# Patient Record
Sex: FEMALE | Race: WHITE | Employment: FULL TIME | URBAN - NONMETROPOLITAN AREA
[De-identification: names, ages, dates, MRNs, and addresses within clinical notes are randomized per-mention and may not be internally consistent; named-entity substitution may affect disease eponyms.]

---

## 2020-08-27 VITALS
BODY MASS INDEX: 26.05 KG/M2 | DIASTOLIC BLOOD PRESSURE: 70 MMHG | SYSTOLIC BLOOD PRESSURE: 100 MMHG | WEIGHT: 182 LBS | HEIGHT: 70 IN

## 2020-08-27 DIAGNOSIS — R35.0 FREQUENCY OF MICTURITION: ICD-10-CM

## 2020-09-21 ENCOUNTER — TELEPHONE (OUTPATIENT)
Dept: OBGYN CLINIC | Age: 40
End: 2020-09-21

## 2020-09-22 RX ORDER — DROSPIRENONE AND ETHINYL ESTRADIOL 0.03MG-3MG
1 KIT ORAL DAILY
Qty: 1 PACKET | Refills: 3 | Status: SHIPPED | OUTPATIENT
Start: 2020-09-22 | End: 2020-12-29 | Stop reason: SDUPTHER

## 2020-12-29 ENCOUNTER — OFFICE VISIT (OUTPATIENT)
Dept: OBGYN CLINIC | Age: 40
End: 2020-12-29
Payer: COMMERCIAL

## 2020-12-29 ENCOUNTER — HOSPITAL ENCOUNTER (OUTPATIENT)
Age: 40
Setting detail: SPECIMEN
Discharge: HOME | End: 2020-12-29
Payer: COMMERCIAL

## 2020-12-29 VITALS
BODY MASS INDEX: 27.55 KG/M2 | DIASTOLIC BLOOD PRESSURE: 74 MMHG | SYSTOLIC BLOOD PRESSURE: 108 MMHG | HEIGHT: 69 IN | WEIGHT: 186 LBS

## 2020-12-29 PROCEDURE — 99395 PREV VISIT EST AGE 18-39: CPT | Performed by: OBSTETRICS & GYNECOLOGY

## 2020-12-29 RX ORDER — DROSPIRENONE AND ETHINYL ESTRADIOL 0.03MG-3MG
1 KIT ORAL DAILY
Qty: 1 PACKET | Refills: 12 | Status: SHIPPED | OUTPATIENT
Start: 2020-12-29 | End: 2021-12-20 | Stop reason: SDUPTHER

## 2020-12-29 RX ORDER — BUSPIRONE HYDROCHLORIDE 7.5 MG/1
7.5 TABLET ORAL 2 TIMES DAILY
COMMUNITY
Start: 2020-08-24

## 2020-12-29 NOTE — PROGRESS NOTES
DATE OF VISIT:  20  PATIENT NAME:  Carmen Villarreal     YOB: 1980    47 y.o. Chief Complaint   Patient presents with    Gynecologic Exam     Pt. needs pap. Pt. reports an ongoing discharge. She has been checked in the past and told that it is just related to hormones. Pt. denies irritation/odor. LMP approx 2 months ago. Needs refill of fidel. No LMP recorded. Primary Care Physician: Clarisse Perez MD    The patient was seen and examined. She has no chiefcomplaint today and is here for her annual exam.  Her bowels are regular. There are no voiding complaints. She denies any bloating. She denies vaginal discharge and was counseled on STD's and the need for barriercontraception. HPI : Carmen Villarreal is a 47 y.o. female  who presents today for her annual.    ______________________________________________________________________    OB History    Para Term  AB Living   2 2       2   SAB TAB Ectopic Molar Multiple Live Births             2      # Outcome Date GA Lbr Ken/2nd Weight Sex Delivery Anes PTL Lv   2 Para            1 Para              Past Medical History:   Diagnosis Date    Abnormal Pap smear of cervix 2007    HPV. Squamous cell abnormalities: ASCUS.  Negative HPV- 0713,0103, 2017 Pos HPV & ASCUS 2008 w/ colpo    Anxiety     H/O bladder infections     Headache     History of HPV infection     HPV in female     condyloma    Obsessive compulsive disorder     Obsessive compulsive disorder     PCOS (polycystic ovarian syndrome)                                                                    Past Surgical History:   Procedure Laterality Date    BACK SURGERY      Pilpmidal Cysts     Family History   Problem Relation Age of Onset    Depression Father     Other Father         alcohol/drug problem, smoking    Mental Illness Father     Other Mother         smoking    Asthma Sister     Depression Sister  Other Sister         alcohol/drug problem, smoking    Mental Illness Sister      Social History     Socioeconomic History    Marital status:      Spouse name: Not on file    Number of children: Not on file    Years of education: Not on file    Highest education level: Not on file   Occupational History    Not on file   Social Needs    Financial resource strain: Not on file    Food insecurity     Worry: Not on file     Inability: Not on file    Transportation needs     Medical: Not on file     Non-medical: Not on file   Tobacco Use    Smoking status: Former Smoker     Years: 10.00    Smokeless tobacco: Never Used   Substance and Sexual Activity    Alcohol use: Yes     Comment: 2 or less per day.  Drug use: Not Currently     Types: Marijuana     Comment: She used marijuana in her past.    Sexual activity: Yes     Partners: Male   Lifestyle    Physical activity     Days per week: Not on file     Minutes per session: Not on file    Stress: Not on file   Relationships    Social connections     Talks on phone: Not on file     Gets together: Not on file     Attends [employer] service: Not on file     Active member of club or organization: Not on file     Attends meetings of clubs or organizations: Not on file     Relationship status: Not on file    Intimate partner violence     Fear of current or ex partner: Not on file     Emotionally abused: Not on file     Physically abused: Not on file     Forced sexual activity: Not on file   Other Topics Concern    Not on file   Social History Narrative    Not on file     Vitals:    12/29/20 0811   BP: 108/74   Site: Right Upper Arm   Position: Sitting   Weight: 186 lb (84.4 kg)   Height: 5' 9\" (1.753 m)     Body mass index is 27.47 kg/m². No LMP recorded.     MEDICATIONS:  Current Outpatient Medications   Medication Sig Dispense Refill    busPIRone (BUSPAR) 7.5 MG tablet Take 7.5 mg by mouth 2 times daily  drospirenone-ethinyl estradiol (WILMAR 28) 3-0.03 MG TABS Take 1 tablet by mouth daily 1 packet 3    PARoxetine HCl (PAXIL PO) Take by mouth      ALPRAZolam (XANAX PO) Take by mouth       No current facility-administered medications for this visit. ALLERGIES:  Allergies as of 12/29/2020 - Review Complete 12/29/2020   Allergen Reaction Noted    Bactrim [sulfamethoxazole-trimethoprim]  08/27/2020    Ciprofloxacin  08/04/2017           Symptoms of decreased mood absent    **If either question is answered in a  positive fashion then completethe PHQ9 Scoring Evaluation and make the appropriate referral**      Gynecologic History:       No LMP recorded. Sexually Active: Yes    STD History: No     Permanent Sterilization:No   Reversible Birth Control: Yes ocp        Hormone Replacement Exposure: No      Genetic Qualified Family History of Breast, Ovarian , Colon or Uterine Cancer:No   If YES see scanned worksheet. Preventative Health Testing:  Colposcopy History:   Date of Last Mammogram: na  Date of Last Colonoscopy:   Date of Last Bone Density:      ______________________________________________________________________    REVIEW OF SYSTEMS:       Review of Systems   Constitutional: Negative for chills and fever. Genitourinary: Positive for vaginal discharge. Negative for dysuria, menstrual problem and pelvic pain. PHYSICAL EXAM:    Physical Exam  Constitutional:       Appearance: Normal appearance. Genitourinary:      Pelvic exam was performed with patient in the lithotomy position. Vulva, vagina, cervix, uterus, right adnexa and left adnexa normal.   HENT:      Head: Atraumatic. Mouth/Throat:      Mouth: Mucous membranes are moist.   Eyes:      Extraocular Movements: Extraocular movements intact. Pupils: Pupils are equal, round, and reactive to light. Neck:      Musculoskeletal: Normal range of motion. Cardiovascular:      Rate and Rhythm: Normal rate.    Pulmonary: Effort: Pulmonary effort is normal.   Chest:      Breasts:         Right: Normal.         Left: Normal.   Abdominal:      General: There is no distension. Palpations: Abdomen is soft. Tenderness: There is no abdominal tenderness. Musculoskeletal: Normal range of motion. Neurological:      Mental Status: She is alert and oriented to person, place, and time. Skin:     General: Skin is warm and dry. Psychiatric:         Mood and Affect: Mood normal.         Behavior: Behavior normal.         Thought Content: Thought content normal.         Judgment: Judgment normal.   Chaperone present: chaperone declined. POC Cultures:  No results found for this visit on 12/29/20. ASSESSMENT:      47 y.o. Annual  1. Women's annual routine gynecological examination          Patient Active Problem List    Diagnosis Date Noted    Frequency of micturition 08/27/2020          Hereditary Breast, Ovarian, Colon and Uterine Cancer screening Done. Tobacco & Secondary smoke risks reviewed; instructed on cessation and avoidance    PLAN:    No follow-ups on file. No orders of the defined types were placed in this encounter. Repeat Annual every 1 year  Cervical Cytology Evaluation begins at 24years old. If Negative Cytology, Follow-up screening per current guidelines. Mammograms every 1year. If 37 yo and last mammogram was negative. Calcium and Vitamin D dosing reviewed. Colonoscopy screening reviewed as well as onset for bone density testing. Birth control and barrier recommendationsdiscussed. STD counseling and prevention reviewed. Gardisil counseling completed for all patients 7-33 yo. Routine healthmaintenance per patients PCP.     Electronicallysigned by:  Irineo Batista DO on 12/29/20

## 2020-12-31 LAB
HPV SAMPLE: NORMAL
HPV, GENOTYPE 16: NOT DETECTED
HPV, GENOTYPE 18: NOT DETECTED
HPV, HIGH RISK OTHER: NOT DETECTED
HPV, INTERPRETATION: NORMAL
SPECIMEN DESCRIPTION: NORMAL

## 2021-01-08 LAB — CYTOLOGY REPORT: NORMAL

## 2021-02-04 ENCOUNTER — PROCEDURE VISIT (OUTPATIENT)
Dept: OBGYN CLINIC | Age: 41
End: 2021-02-04
Payer: COMMERCIAL

## 2021-02-04 DIAGNOSIS — R30.0 DYSURIA: Primary | ICD-10-CM

## 2021-02-04 DIAGNOSIS — L91.8 ACHROCHORDON: ICD-10-CM

## 2021-02-04 LAB
BILIRUBIN, POC: NORMAL
BLOOD URINE, POC: NORMAL
CLARITY, POC: NORMAL
COLOR, POC: NORMAL
GLUCOSE URINE, POC: NORMAL
KETONES, POC: NORMAL
LEUKOCYTE EST, POC: NORMAL
NITRITE, POC: NORMAL
PH, POC: 6.5
PROTEIN, POC: NORMAL
SPECIFIC GRAVITY, POC: 1.01
UROBILINOGEN, POC: NORMAL

## 2021-02-04 PROCEDURE — 81002 URINALYSIS NONAUTO W/O SCOPE: CPT | Performed by: OBSTETRICS & GYNECOLOGY

## 2021-02-04 PROCEDURE — 11200 RMVL SKIN TAGS UP TO&INC 15: CPT | Performed by: OBSTETRICS & GYNECOLOGY

## 2021-02-04 RX ORDER — NITROFURANTOIN 25; 75 MG/1; MG/1
100 CAPSULE ORAL 2 TIMES DAILY
Qty: 10 CAPSULE | Refills: 0 | Status: SHIPPED | OUTPATIENT
Start: 2021-02-04 | End: 2021-02-09

## 2021-02-04 NOTE — PROGRESS NOTES
Skin Tag Removal Procedure Note    Pre-operative Diagnosis: Classic skin tags (acrochordon)    Post-operative Diagnosis: Classic skin tags (acrochordon)    Locations:anterior chest and neck    Anesthesia: Lidocaine 2% with epinephrine without added sodium bicarbonate    Procedure Details   The risks (including bleeding and infection) and benefits of the procedure and Verbal informed consent obtained. Using sterile iris scissors, multiple skin tags were snipped off at their bases after cleansing with Betadine. Bleeding was controlled by silver nitrate and pressure. Findings:  Pathognomonic benign lesions  not sent for pathological exam.    Condition:  Stable    Complications:  none. Plan:  1. Instructed to keep the wounds dry and covered for 24-48h and clean thereafter. 2. Warning signs of infection were reviewed. 3. Recommended that the patient use OTC analgesics as needed for pain. 4. Return as needed.

## 2021-02-15 ENCOUNTER — TELEPHONE (OUTPATIENT)
Dept: OBGYN CLINIC | Age: 41
End: 2021-02-15

## 2021-02-17 NOTE — TELEPHONE ENCOUNTER
It appears pt is allergic to Cipro. I did call her to confirm. She called me back and left v/m stating that she thinks it gives her nausea and she would prefer not to take it.  Can we send her something else in?

## 2021-02-22 DIAGNOSIS — R30.0 DYSURIA: Primary | ICD-10-CM

## 2021-02-22 DIAGNOSIS — R35.0 FREQUENCY OF MICTURITION: ICD-10-CM

## 2021-02-22 RX ORDER — CEPHALEXIN 500 MG/1
500 CAPSULE ORAL 2 TIMES DAILY
Qty: 14 CAPSULE | Refills: 0 | Status: SHIPPED | OUTPATIENT
Start: 2021-02-22 | End: 2021-03-01

## 2021-02-22 NOTE — TELEPHONE ENCOUNTER
Spoke to patient, she is still noticing UTI symptoms. Rx for Keflex e-prescribed to [facility]. Patient to call if no improvement after a few days. Patient verbalized understanding, no further questions/concerns voiced.

## 2021-12-20 ENCOUNTER — TELEPHONE (OUTPATIENT)
Dept: OBGYN CLINIC | Age: 41
End: 2021-12-20

## 2021-12-20 RX ORDER — DROSPIRENONE AND ETHINYL ESTRADIOL 0.03MG-3MG
1 KIT ORAL DAILY
Qty: 1 PACKET | Refills: 0 | Status: SHIPPED | OUTPATIENT
Start: 2021-12-20 | End: 2021-12-30 | Stop reason: SDUPTHER

## 2021-12-20 NOTE — TELEPHONE ENCOUNTER
Received a fax from [facility] that patient is needing refills of her OCPs. She has an annual appt scheduled for 12/30 with you. Ok to send in one refill?

## 2021-12-30 ENCOUNTER — OFFICE VISIT (OUTPATIENT)
Dept: OBGYN CLINIC | Age: 41
End: 2021-12-30
Payer: COMMERCIAL

## 2021-12-30 VITALS
WEIGHT: 203 LBS | HEIGHT: 70 IN | DIASTOLIC BLOOD PRESSURE: 84 MMHG | SYSTOLIC BLOOD PRESSURE: 116 MMHG | BODY MASS INDEX: 29.06 KG/M2

## 2021-12-30 DIAGNOSIS — Z12.72 SMEAR, VAGINAL, AS PART OF ROUTINE GYNECOLOGICAL EXAMINATION: Primary | ICD-10-CM

## 2021-12-30 DIAGNOSIS — R23.9 SKIN CHANGE: ICD-10-CM

## 2021-12-30 DIAGNOSIS — Z01.419 SMEAR, VAGINAL, AS PART OF ROUTINE GYNECOLOGICAL EXAMINATION: Primary | ICD-10-CM

## 2021-12-30 PROCEDURE — 99395 PREV VISIT EST AGE 18-39: CPT | Performed by: ADVANCED PRACTICE MIDWIFE

## 2021-12-30 RX ORDER — DROSPIRENONE AND ETHINYL ESTRADIOL 0.03MG-3MG
1 KIT ORAL DAILY
Qty: 3 PACKET | Refills: 4 | Status: SHIPPED | OUTPATIENT
Start: 2021-12-30 | End: 2022-01-10 | Stop reason: SDUPTHER

## 2021-12-30 RX ORDER — PAROXETINE HYDROCHLORIDE 40 MG/1
TABLET, FILM COATED ORAL
COMMUNITY
Start: 2021-12-10

## 2021-12-30 RX ORDER — CEPHALEXIN 250 MG/1
CAPSULE ORAL
COMMUNITY
Start: 2021-10-05

## 2021-12-30 ASSESSMENT — ENCOUNTER SYMPTOMS
GASTROINTESTINAL NEGATIVE: 1
RESPIRATORY NEGATIVE: 1
SHORTNESS OF BREATH: 0
ABDOMINAL PAIN: 0
DIARRHEA: 0
CONSTIPATION: 0

## 2021-12-30 NOTE — PROGRESS NOTES
YEARLY PHYSICAL    Date of service: 2021    Kalyn Rodriguez  Is a 46 y.o.   female    PT's PCP is: Ham Aldana MD     : 1980                                             Subjective:       Patient's last menstrual period was 2021. Are your menses regular: yes every 3 months    OB History    Para Term  AB Living   2 2       2   SAB IAB Ectopic Molar Multiple Live Births             2      # Outcome Date GA Lbr Ken/2nd Weight Sex Delivery Anes PTL Lv   2 Para            1 Para                 Social History     Tobacco Use   Smoking Status Former Smoker    Years: 10.00   Smokeless Tobacco Never Used        Social History     Substance and Sexual Activity   Alcohol Use Yes    Comment: 2 or less per day.        Family History   Problem Relation Age of Onset    Depression Father     Other Father         alcohol/drug problem, smoking    Mental Illness Father     Other Mother         smoking    Asthma Sister     Depression Sister     Other Sister         alcohol/drug problem, smoking    Mental Illness Sister        Any family history of breast or ovarian cancer: No    Any family history of blood clots: No      Allergies: Bactrim [sulfamethoxazole-trimethoprim] and Ciprofloxacin      Current Outpatient Medications:     cephALEXin (KEFLEX) 250 MG capsule, take 1 capsule by mouth once daily if needed AROUND TIME OF SEXUAL ACTIVITY, Disp: , Rfl:     PARoxetine (PAXIL) 40 MG tablet, take 1 tablet by mouth once daily, Disp: , Rfl:     drospirenone-ethinyl estradiol (WILMAR 28) 3-0.03 MG TABS, Take 1 tablet by mouth daily, Disp: 3 packet, Rfl: 4    busPIRone (BUSPAR) 7.5 MG tablet, Take 7.5 mg by mouth 2 times daily, Disp: , Rfl:     ALPRAZolam (XANAX PO), Take by mouth, Disp: , Rfl:     Social History     Substance and Sexual Activity   Sexual Activity Yes    Partners: Male       Any bleeding or pain with intercourse: No    Last Yearly:  2020    Last pap: 2020 - normal    Last HPV: 2020 - negative    Do you do self breast exams: Encouraged    Past Medical History:   Diagnosis Date    Abnormal Pap smear of cervix 2007    HPV. Squamous cell abnormalities: ASCUS. 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 Family History   Problem Relation Age of Onset    Depression Father     Other Father         alcohol/drug problem, smoking    Mental Illness Father     Other Mother         smoking    Asthma Sister     Depression Sister     Other Sister         alcohol/drug problem, smoking    Mental Illness Sister        Chief Complaint   Patient presents with    Annual Exam     Last pap 12- NL/NEG. Pt. denies gyn concerns. She has noticed what she believes is a skin tag on her upper back. Pt. needs refill of ocp, she takes continuously and only has a period every 3 months. Labs:    No results found for this visit on 12/30/21. HPI: Annual.  Denies breast/pelvic concerns. Menses q3m. Monogamous relationship. Pap UTD. Desires refill of OCP for menses control. Has \"spot\" on upper back - onset 6 months - tender intermittently with touch, no drainage ever, slightly increased in size over the last 6 months. Review of Systems   Constitutional: Negative. Negative for chills, fatigue and fever. HENT: Negative. Respiratory: Negative. Negative for shortness of breath. Cardiovascular: Negative. Negative for chest pain. Gastrointestinal: Negative.   Negative for abdominal pain, constipation and diarrhea. Genitourinary: Negative for dysuria, enuresis, frequency, menstrual problem, pelvic pain, urgency and vaginal bleeding. Musculoskeletal: Negative. Skin:        \"spot on my upper back want checked out\"   Neurological: Negative. Negative for dizziness, light-headedness and headaches. Psychiatric/Behavioral: Negative. Objective  Blood pressure 116/84, height 5' 9.5\" (1.765 m), weight 203 lb (92.1 kg), last menstrual period 12/23/2021. Physical Exam  Constitutional:       Appearance: Normal appearance. She is normal weight. Genitourinary:      Vulva, bladder and urethral meatus normal.      No vaginal discharge or tenderness. No vaginal prolapse present. Right Adnexa: not tender. Left Adnexa: not tender. No cervical motion tenderness or discharge. Uterus is not tender. Pelvic exam was performed with patient in the lithotomy position. Breasts: Breasts are symmetrical.      Breasts are soft. Right: No mass, nipple discharge, skin change or tenderness. Left: No mass, nipple discharge, skin change or tenderness. HENT:      Head: Normocephalic. Eyes:      Pupils: Pupils are equal, round, and reactive to light. Cardiovascular:      Rate and Rhythm: Normal rate and regular rhythm. Pulses: Normal pulses. Heart sounds: Normal heart sounds. No murmur heard. Pulmonary:      Effort: Pulmonary effort is normal.      Breath sounds: Normal breath sounds. No wheezing. Abdominal:      Palpations: Abdomen is soft. Tenderness: There is no abdominal tenderness. Musculoskeletal:         General: Normal range of motion. Cervical back: Normal range of motion. No muscular tenderness. Neurological:      Mental Status: She is alert and oriented to person, place, and time. Skin:     General: Skin is warm and dry. Findings: Lesion present.           Psychiatric:         Behavior: Behavior normal.   Vitals and nursing note reviewed. Chaperone present: Declined. Assessment and Plan          Diagnosis Orders   1. Smear, vaginal, as part of routine gynecological examination     2. Skin change       Repeat Annual every 1 year  Cervical Cytology Evaluation begins at 24years old. If Negative Cytology, Follow-up screening per current guidelines. Mammograms every 1year. If 37 yo and last mammogram was negative. Calcium and Vitamin D dosing reviewed. Colonoscopy screening reviewed as well as onset for bone density testing. Birth control and barrier recommendationsdiscussed. STD counseling and prevention reviewed. Gardisil counseling completed for all patients. Routine healthmaintenance per patients PCP. Reviewed OCP MOA, side effects, ACHES        I am having Brenda Cannon maintain her ALPRAZolam (XANAX PO), busPIRone, cephALEXin, PARoxetine, and drospirenone-ethinyl estradiol. Return in about 1 year (around 12/30/2022) for Yearly. She was also counseled on her preventative health maintenance recommendations and follow-up. There are no Patient Instructions on file for this visit.     Abdirahman 6, APRN - [facility],12/30/2021 9:49 AM

## 2022-01-10 RX ORDER — DROSPIRENONE AND ETHINYL ESTRADIOL 0.03MG-3MG
1 KIT ORAL DAILY
Qty: 3 PACKET | Refills: 4 | Status: SHIPPED | OUTPATIENT
Start: 2022-01-10

## 2022-01-10 NOTE — TELEPHONE ENCOUNTER
Pt calling stating OCP was never refilled at time of annual on 12/30. Pt takes in continuous fashion and would like extra refills so she doesn't have to call in again. Can you please send rx to [facility]. Thanks!

## 2022-12-05 RX ORDER — DROSPIRENONE AND ETHINYL ESTRADIOL 0.03MG-3MG
KIT ORAL
Qty: 84 TABLET | Refills: 0 | Status: SHIPPED | OUTPATIENT
Start: 2022-12-05 | End: 2023-01-03 | Stop reason: SDUPTHER

## 2023-01-03 ENCOUNTER — OFFICE VISIT (OUTPATIENT)
Dept: OBGYN CLINIC | Age: 43
End: 2023-01-03
Payer: COMMERCIAL

## 2023-01-03 VITALS
WEIGHT: 189.2 LBS | DIASTOLIC BLOOD PRESSURE: 70 MMHG | BODY MASS INDEX: 27.09 KG/M2 | HEIGHT: 70 IN | SYSTOLIC BLOOD PRESSURE: 118 MMHG

## 2023-01-03 DIAGNOSIS — Z13.220 SCREENING FOR LIPID DISORDERS: ICD-10-CM

## 2023-01-03 DIAGNOSIS — Z13.89 ENCOUNTER FOR SCREENING FOR OTHER DISORDER: ICD-10-CM

## 2023-01-03 DIAGNOSIS — Z13.29 SCREENING FOR THYROID DISORDER: ICD-10-CM

## 2023-01-03 DIAGNOSIS — Z01.419 SMEAR, VAGINAL, AS PART OF ROUTINE GYNECOLOGICAL EXAMINATION: Primary | ICD-10-CM

## 2023-01-03 DIAGNOSIS — Z12.72 SMEAR, VAGINAL, AS PART OF ROUTINE GYNECOLOGICAL EXAMINATION: Primary | ICD-10-CM

## 2023-01-03 PROCEDURE — 99396 PREV VISIT EST AGE 40-64: CPT | Performed by: ADVANCED PRACTICE MIDWIFE

## 2023-01-03 RX ORDER — DROSPIRENONE AND ETHINYL ESTRADIOL 0.03MG-3MG
KIT ORAL
Qty: 84 TABLET | Refills: 4 | Status: SHIPPED | OUTPATIENT
Start: 2023-01-03

## 2023-01-03 RX ORDER — MELOXICAM 7.5 MG/1
TABLET ORAL
COMMUNITY
Start: 2022-12-19

## 2023-01-03 ASSESSMENT — ENCOUNTER SYMPTOMS
DIARRHEA: 0
SHORTNESS OF BREATH: 0
ABDOMINAL PAIN: 0
CONSTIPATION: 0
GASTROINTESTINAL NEGATIVE: 1
RESPIRATORY NEGATIVE: 1

## 2023-01-03 NOTE — PROGRESS NOTES
YEARLY PHYSICAL    Date of service: 1/3/2023    Yolanda Baker  Is a 38 y.o.   female    PT's PCP is: Len Ji MD     : 1980                                             Subjective:       Patient's last menstrual period was 2022 (exact date). Are your menses regular: every 3 months    OB History    Para Term  AB Living   2 2       2   SAB IAB Ectopic Molar Multiple Live Births             2      # Outcome Date GA Lbr Ken/2nd Weight Sex Delivery Anes PTL Lv   2 Para            1 Para                 Social History     Tobacco Use   Smoking Status Former    Years: 10.    Types: Cigarettes   Smokeless Tobacco Never        Social History     Substance and Sexual Activity   Alcohol Use Yes    Comment: 2 or less per day. Family History   Problem Relation Age of Onset    Depression Father     Other Father         alcohol/drug problem, smoking    Mental Illness Father     Other Mother         smoking    Asthma Sister     Depression Sister     Other Sister         alcohol/drug problem, smoking    Mental Illness Sister        Any family history of breast or ovarian cancer: No    Any family history of blood clots: No      Allergies: Bactrim [sulfamethoxazole-trimethoprim] and Ciprofloxacin      Current Outpatient Medications:     meloxicam (MOBIC) 7.5 MG tablet, take 1 tablet by mouth twice a day if needed, Disp: , Rfl:     drospirenone-ethinyl estradiol 3-0.03 MG TABS, take 1 tablet by mouth once daily.   Continuous cycling so cycle every 3 months, Disp: 84 tablet, Rfl: 4    cephALEXin (KEFLEX) 250 MG capsule, take 1 capsule by mouth once daily if needed AROUND TIME OF SEXUAL ACTIVITY, Disp: , Rfl:     PARoxetine (PAXIL) 40 MG tablet, take 1 tablet by mouth once daily, Disp: , Rfl:     busPIRone (BUSPAR) 7.5 MG tablet, Take 7.5 mg by mouth 2 times daily, Disp: , Rfl:     ALPRAZolam (XANAX PO), Take by mouth As needed but has not used in a while., Disp: , Rfl:     Social History     Substance and Sexual Activity   Sexual Activity Yes    Partners: Male       Any bleeding or pain with intercourse: No    Last Yearly:  2021    Last pap: 2020 - normal    Last HPV: 2020 - negative    Last Mammogram: Due    Do you do self breast exams: Encouraged    Past Medical History:   Diagnosis Date    Abnormal Pap smear of cervix 2007    HPV. Squamous cell abnormalities: ASCUS. Negative HPV- 3795,4479, 2017 Pos HPV & ASCUS 2008 w/ colpo    Anxiety     H/O bladder infections     Headache     History of HPV infection     HPV in female     condyloma    Obsessive compulsive disorder     Obsessive compulsive disorder     PCOS (polycystic ovarian syndrome)     Spinal stenosis in cervical region 2022       Past Surgical History:   Procedure Laterality Date    BACK SURGERY  2000    Pilpmidal Cysts    WRIST SURGERY Right 2021    cyst removed and cleaned up tendons. Family History   Problem Relation Age of Onset    Depression Father     Other Father         alcohol/drug problem, smoking    Mental Illness Father     Other Mother         smoking    Asthma Sister     Depression Sister     Other Sister         alcohol/drug problem, smoking    Mental Illness Sister        Chief Complaint   Patient presents with    Annual Exam     Pap NL 12/29/20. Mammogram due. Pt denies concerns. Pt desires refill of OCP, pt having cycles every 3 months and would like 90 day supply. Labs:    No results found for this visit on 01/03/23. HPI:  Annual.  Denies breast/pelvic concerns. Menses controlled with OCP - desires refill. Pap normal 2020. Monogamous relationship. Due for mammogram    Review of Systems   Constitutional: Negative. Negative for chills, fatigue and fever. HENT: Negative. Respiratory: Negative. Negative for shortness of breath. Cardiovascular: Negative. Negative for chest pain. Gastrointestinal: Negative.   Negative for abdominal pain, constipation and diarrhea. Genitourinary:  Negative for dysuria, enuresis, frequency, menstrual problem, pelvic pain, urgency and vaginal bleeding. Musculoskeletal: Negative. Neurological: Negative. Negative for dizziness, light-headedness and headaches. Psychiatric/Behavioral: Negative. Objective  Blood pressure 118/70, height 5' 9.5\" (1.765 m), weight 189 lb 3.2 oz (85.8 kg), last menstrual period 12/24/2022. Physical Exam  Constitutional:       Appearance: Normal appearance. She is normal weight. Genitourinary:      Vulva, bladder and urethral meatus normal.      No vaginal discharge or tenderness. No vaginal prolapse present. Right Adnexa: not tender. Left Adnexa: not tender. No cervical motion tenderness or discharge. Uterus is not tender. Pelvic exam was performed with patient in the lithotomy position. Breasts:     Breasts are symmetrical.      Breasts are soft. Right: No mass, nipple discharge, skin change or tenderness. Left: No mass, nipple discharge, skin change or tenderness. HENT:      Head: Normocephalic. Eyes:      Pupils: Pupils are equal, round, and reactive to light. Cardiovascular:      Rate and Rhythm: Normal rate and regular rhythm. Pulses: Normal pulses. Heart sounds: Normal heart sounds. No murmur heard. Pulmonary:      Effort: Pulmonary effort is normal.      Breath sounds: Normal breath sounds. No wheezing. Abdominal:      Palpations: Abdomen is soft. Tenderness: There is no abdominal tenderness. Musculoskeletal:         General: Normal range of motion. Cervical back: Normal range of motion. No muscular tenderness. Neurological:      Mental Status: She is alert and oriented to person, place, and time. Skin:     General: Skin is warm and dry. Psychiatric:         Behavior: Behavior normal.   Vitals and nursing note reviewed. Chaperone present: Declined. Assessment and Plan          Diagnosis Orders   1. Smear, vaginal, as part of routine gynecological examination            Repeat Annual every 1 year  Cervical Cytology Evaluation begins at 24years old. If Negative Cytology, Follow-up screening per current guidelines. Mammograms every 1year. If 35 yo and last mammogram was negative. Calcium and Vitamin D dosing reviewed. Birth control and barrier recommendationsdiscussed. STD counseling and prevention reviewed. Routine healthmaintenance per patients PCP. Encouraged to have wellness labs done - patient not fasting today - will return to our office for these      I have changed Chandana Dnulap drospirenone-ethinyl estradiol. I am also having her maintain her ALPRAZolam (XANAX PO), busPIRone, cephALEXin, PARoxetine, and meloxicam.    Return in about 1 year (around 1/3/2024) for Yearly. She was also counseled on her preventative health maintenance recommendations and follow-up. There are no Patient Instructions on file for this visit.     Abdirahman 6, APRN - CNM,1/3/2023 9:57 AM

## 2023-01-11 ENCOUNTER — HOSPITAL ENCOUNTER (OUTPATIENT)
Age: 43
Setting detail: SPECIMEN
Discharge: HOME | End: 2023-01-11

## 2023-01-11 DIAGNOSIS — Z13.89 ENCOUNTER FOR SCREENING FOR OTHER DISORDER: ICD-10-CM

## 2023-01-11 DIAGNOSIS — Z13.220 SCREENING FOR LIPID DISORDERS: ICD-10-CM

## 2023-01-11 DIAGNOSIS — Z13.29 SCREENING FOR THYROID DISORDER: ICD-10-CM

## 2023-01-11 LAB
ABSOLUTE EOS #: 0.33 K/UL (ref 0–0.44)
ABSOLUTE IMMATURE GRANULOCYTE: <0.03 K/UL (ref 0–0.3)
ABSOLUTE LYMPH #: 2.56 K/UL (ref 1.1–3.7)
ABSOLUTE MONO #: 0.52 K/UL (ref 0.1–1.2)
BASOPHILS # BLD: 1 % (ref 0–2)
BASOPHILS ABSOLUTE: 0.05 K/UL (ref 0–0.2)
CHOLESTEROL, FASTING: 199 MG/DL
CHOLESTEROL/HDL RATIO: 2.4
EOSINOPHILS RELATIVE PERCENT: 5 % (ref 1–4)
HCT VFR BLD CALC: 43.7 % (ref 36.3–47.1)
HDLC SERPL-MCNC: 83 MG/DL
HEMOGLOBIN: 14 G/DL (ref 11.9–15.1)
IMMATURE GRANULOCYTES: 0 %
LDL CHOLESTEROL: 89 MG/DL (ref 0–130)
LYMPHOCYTES # BLD: 36 % (ref 24–43)
MCH RBC QN AUTO: 30 PG (ref 25.2–33.5)
MCHC RBC AUTO-ENTMCNC: 32 G/DL (ref 28.4–34.8)
MCV RBC AUTO: 93.6 FL (ref 82.6–102.9)
MONOCYTES # BLD: 7 % (ref 3–12)
NRBC AUTOMATED: 0 PER 100 WBC
PDW BLD-RTO: 12.4 % (ref 11.8–14.4)
PLATELET # BLD: 298 K/UL (ref 138–453)
PMV BLD AUTO: 10 FL (ref 8.1–13.5)
RBC # BLD: 4.67 M/UL (ref 3.95–5.11)
SEG NEUTROPHILS: 51 % (ref 36–65)
SEGMENTED NEUTROPHILS ABSOLUTE COUNT: 3.57 K/UL (ref 1.5–8.1)
TRIGLYCERIDE, FASTING: 134 MG/DL
TSH SERPL DL<=0.05 MIU/L-ACNC: 1.26 UIU/ML (ref 0.3–5)
WBC # BLD: 7.1 K/UL (ref 3.5–11.3)

## 2024-01-04 ENCOUNTER — HOSPITAL ENCOUNTER (OUTPATIENT)
Age: 44
Setting detail: SPECIMEN
Discharge: HOME | End: 2024-01-04

## 2024-01-04 ENCOUNTER — TELEPHONE (OUTPATIENT)
Dept: OBGYN CLINIC | Age: 44
End: 2024-01-04

## 2024-01-04 ENCOUNTER — OFFICE VISIT (OUTPATIENT)
Dept: OBGYN CLINIC | Age: 44
End: 2024-01-04

## 2024-01-04 VITALS
DIASTOLIC BLOOD PRESSURE: 76 MMHG | SYSTOLIC BLOOD PRESSURE: 110 MMHG | BODY MASS INDEX: 28.86 KG/M2 | WEIGHT: 201.6 LBS | HEIGHT: 70 IN

## 2024-01-04 DIAGNOSIS — Z80.0 FAMILY HISTORY OF LIVER CANCER: ICD-10-CM

## 2024-01-04 DIAGNOSIS — Z80.0 FAMILY HISTORY OF COLON CANCER IN MOTHER: ICD-10-CM

## 2024-01-04 DIAGNOSIS — Z82.49 FAMILY HISTORY OF DVT: ICD-10-CM

## 2024-01-04 DIAGNOSIS — Z01.419 SMEAR, VAGINAL, AS PART OF ROUTINE GYNECOLOGICAL EXAMINATION: Primary | ICD-10-CM

## 2024-01-04 DIAGNOSIS — Z12.72 SMEAR, VAGINAL, AS PART OF ROUTINE GYNECOLOGICAL EXAMINATION: Primary | ICD-10-CM

## 2024-01-04 DIAGNOSIS — Z30.41 ORAL CONTRACEPTIVE PILL SURVEILLANCE: ICD-10-CM

## 2024-01-04 DIAGNOSIS — Z12.4 SCREENING FOR CERVICAL CANCER: ICD-10-CM

## 2024-01-04 DIAGNOSIS — Z79.899 MEDICATION MANAGEMENT: ICD-10-CM

## 2024-01-04 RX ORDER — DROSPIRENONE AND ETHINYL ESTRADIOL 0.03MG-3MG
KIT ORAL
Qty: 84 TABLET | Refills: 4 | Status: SHIPPED | OUTPATIENT
Start: 2024-01-04

## 2024-01-04 ASSESSMENT — ENCOUNTER SYMPTOMS
DIARRHEA: 0
CONSTIPATION: 0
GASTROINTESTINAL NEGATIVE: 1
SHORTNESS OF BREATH: 0
ABDOMINAL PAIN: 0
RESPIRATORY NEGATIVE: 1

## 2024-01-04 NOTE — PROGRESS NOTES
YEARLY PHYSICAL    Date of service: 2024    Ashlyn Guadalupe  Is a 43 y.o.   female    PT's PCP is: Leatha Yates MD     : 1980                                             Subjective:       Patient's last menstrual period was 2023 (approximate).     Are your menses regular: yes every 3 months    OB History    Para Term  AB Living   2 2       2   SAB IAB Ectopic Molar Multiple Live Births             2      # Outcome Date GA Lbr Ken/2nd Weight Sex Delivery Anes PTL Lv   2 Para            1 Para                 Social History     Tobacco Use   Smoking Status Former    Types: Cigarettes   Smokeless Tobacco Never        Social History     Substance and Sexual Activity   Alcohol Use Yes    Comment: 2 or less per day.       Family History   Problem Relation Age of Onset    Depression Father     Other Father         alcohol/drug problem, smoking    Mental Illness Father     Colon Cancer Mother         primary    Other Mother         smoking    Liver Cancer Mother         secondary    Stroke Mother         just prior to passing away    Deep Vein Thrombosis Mother         believe related to liver cancer    Asthma Sister     Depression Sister     Other Sister         alcohol/drug problem, smoking    Mental Illness Sister        Any family history of breast or ovarian cancer: No    Any family history of blood clots: Yes      Allergies: Ciprofloxacin and Sulfamethoxazole-trimethoprim      Current Outpatient Medications:     drospirenone-ethinyl estradiol 3-0.03 MG TABS, take 1 tablet by mouth once daily.  Continuous cycling so cycle every 3 months, Disp: 84 tablet, Rfl: 4    meloxicam (MOBIC) 7.5 MG tablet, take 1 tablet by mouth twice a day if needed, Disp: , Rfl:     cephALEXin (KEFLEX) 250 MG capsule, take 1 capsule by mouth once daily if needed AROUND TIME OF SEXUAL ACTIVITY, Disp: , Rfl:     PARoxetine (PAXIL) 40

## 2024-01-04 NOTE — TELEPHONE ENCOUNTER
PA labs via [employer] and Factor 5 is covered and does not need a PA. MTHFR is not covered and is considered experimental and an appeal may be done. Spoke with patient and she is okay with just getting the Factor 5 lab drawn. Will come in to lab to have drawn.

## 2024-01-04 NOTE — PROGRESS NOTES
Chaperone for Intimate Exam  Chaperone was offered as part of the rooming process. Patient declined and agrees to continue with exam without a chaperone.

## 2024-01-06 LAB
HPV I/H RISK 4 DNA CVX QL NAA+PROBE: NOT DETECTED
HPV SAMPLE: NORMAL
HPV, INTERPRETATION: NORMAL
HPV16 DNA CVX QL NAA+PROBE: NOT DETECTED
HPV18 DNA CVX QL NAA+PROBE: NOT DETECTED
SPECIMEN DESCRIPTION: NORMAL

## 2024-01-09 LAB
F5 P.R506Q BLD/T QL: NEGATIVE
SPECIMEN SOURCE: NORMAL

## 2024-01-16 LAB — CYTOLOGY REPORT: NORMAL

## 2024-04-11 ENCOUNTER — HOSPITAL ENCOUNTER (OUTPATIENT)
Age: 44
Setting detail: SPECIMEN
Discharge: HOME | End: 2024-04-11

## 2024-04-11 LAB
25(OH)D3 SERPL-MCNC: 49.1 NG/ML (ref 30–100)
VIT B12 SERPL-MCNC: 416 PG/ML (ref 232–1245)

## 2025-01-02 RX ORDER — DROSPIRENONE AND ETHINYL ESTRADIOL 0.03MG-3MG
KIT ORAL
Qty: 168 TABLET | Refills: 0 | OUTPATIENT
Start: 2025-01-02

## 2025-01-06 RX ORDER — DROSPIRENONE AND ETHINYL ESTRADIOL 0.03MG-3MG
KIT ORAL
Qty: 84 TABLET | Refills: 4 | Status: CANCELLED | OUTPATIENT
Start: 2025-01-06

## 2025-01-06 ASSESSMENT — ENCOUNTER SYMPTOMS
SHORTNESS OF BREATH: 0
DIARRHEA: 0
GASTROINTESTINAL NEGATIVE: 1
ABDOMINAL PAIN: 0
RESPIRATORY NEGATIVE: 1
CONSTIPATION: 0

## 2025-01-07 ENCOUNTER — OFFICE VISIT (OUTPATIENT)
Dept: OBGYN CLINIC | Age: 45
End: 2025-01-07
Payer: COMMERCIAL

## 2025-01-07 ENCOUNTER — TELEPHONE (OUTPATIENT)
Dept: OBGYN CLINIC | Age: 45
End: 2025-01-07

## 2025-01-07 VITALS
SYSTOLIC BLOOD PRESSURE: 110 MMHG | WEIGHT: 213 LBS | HEIGHT: 70 IN | DIASTOLIC BLOOD PRESSURE: 80 MMHG | BODY MASS INDEX: 30.49 KG/M2

## 2025-01-07 DIAGNOSIS — G89.29 CHRONIC NONINTRACTABLE HEADACHE, UNSPECIFIED HEADACHE TYPE: ICD-10-CM

## 2025-01-07 DIAGNOSIS — Z01.419 SMEAR, VAGINAL, AS PART OF ROUTINE GYNECOLOGICAL EXAMINATION: Primary | ICD-10-CM

## 2025-01-07 DIAGNOSIS — Z30.41 ORAL CONTRACEPTIVE PILL SURVEILLANCE: ICD-10-CM

## 2025-01-07 DIAGNOSIS — Z12.72 SMEAR, VAGINAL, AS PART OF ROUTINE GYNECOLOGICAL EXAMINATION: Primary | ICD-10-CM

## 2025-01-07 DIAGNOSIS — Z80.0 FAMILY HISTORY OF COLON CANCER IN MOTHER: ICD-10-CM

## 2025-01-07 DIAGNOSIS — Z79.899 MEDICATION THERAPY CHANGED: ICD-10-CM

## 2025-01-07 DIAGNOSIS — R92.30 DENSE BREAST TISSUE: ICD-10-CM

## 2025-01-07 DIAGNOSIS — R51.9 CHRONIC NONINTRACTABLE HEADACHE, UNSPECIFIED HEADACHE TYPE: ICD-10-CM

## 2025-01-07 PROCEDURE — 99214 OFFICE O/P EST MOD 30 MIN: CPT | Performed by: ADVANCED PRACTICE MIDWIFE

## 2025-01-07 PROCEDURE — 99396 PREV VISIT EST AGE 40-64: CPT | Performed by: ADVANCED PRACTICE MIDWIFE

## 2025-01-07 RX ORDER — CHLORAL HYDRATE 500 MG
1 CAPSULE ORAL DAILY
COMMUNITY

## 2025-01-07 RX ORDER — ACETAMINOPHEN AND CODEINE PHOSPHATE 120; 12 MG/5ML; MG/5ML
1 SOLUTION ORAL DAILY
Qty: 30 TABLET | Refills: 12 | Status: SHIPPED | OUTPATIENT
Start: 2025-01-07

## 2025-01-07 NOTE — TELEPHONE ENCOUNTER
Would like screening colonoscopy - has fam hx (in her mother) of colon cancer.  Not had baseline yet.  [facility]  Please send referral to gen surg or GI for this

## 2025-01-07 NOTE — PROGRESS NOTES
Chaperone for Intimate Exam  Chaperone was offered as part of the rooming process. Patient declined and agrees to continue with exam without a chaperone.       
every 1year. If 41 yo and last mammogram was negative.  Calcium and Vitamin D dosing reviewed.  Birth control and barrier recommendationsdiscussed.  STD counseling and prevention reviewed.  Routine healthmaintenance per patients PCP.    Discussed current age, BMI, and slightly increased risk for thrombosis with SHWETA use - per patient normal cholesterol, non smoker, not morbid obesity.  Lipid panel from 2023 in system - reviewed - borderline total cholesterol.  Recommended change to progestin only secondary to her age, frequency of migraine headaches, borderline lipid panel with no repeat as of yet.  Also of note does have fam hx of thrombosis - patient had negative factor 5 - has reported that her mothers DVT was incidental finding when undergoing cancer treatments.   After thorough discussion patient open to changing to progestin only for cycle mgmt.  We reviewed difference in norethindrone vs drosperinone.  Discussed side effects and bleeding changes and MOA.  Discussed daily dosing pattern.  After discussion patient is open to trying norethindrone and will call if a lot of BTB and irreg bleeding in the next few months.  If this occurs will change to Slynd.  Patient planning to finish current pill pack then start new progestin only pills    Reviewed fam hx of colon cancer - patient desires referral for screening - desires this in [place].  Will send    Discussed mammogram screening frequency - patient open to doing this - would like order fax[facility] to [facility]    Discussed her headache frequency, neck pain.  Do not believe headaches are related to menses due to frequency.  Recommended to see PCP for possible rx for mgmt of this and also wellness labs.  Patient agreeable to call them today    In addition to routine annual the patient, Ashlyn Guadalupe,  was seen with a total additional time spent of 40 minutes for the visit on this date of service by the [facility]  The time component, involved both face-to-face

## 2025-02-03 ENCOUNTER — HOSPITAL ENCOUNTER (OUTPATIENT)
Age: 45
Setting detail: SPECIMEN
Discharge: HOME | End: 2025-02-03

## 2025-02-03 DIAGNOSIS — Z13.220 SCREENING FOR LIPID DISORDERS: ICD-10-CM

## 2025-02-03 LAB
25(OH)D3 SERPL-MCNC: 53.3 NG/ML (ref 30–100)
CHOLEST SERPL-MCNC: 207 MG/DL (ref 0–199)
CHOLESTEROL/HDL RATIO: 2.9
HDLC SERPL-MCNC: 71 MG/DL
LDLC SERPL CALC-MCNC: 103 MG/DL (ref 0–100)
TRIGL SERPL-MCNC: 165 MG/DL (ref 0–149)
VIT B12 SERPL-MCNC: 717 PG/ML (ref 232–1245)
VLDLC SERPL CALC-MCNC: 33 MG/DL (ref 1–30)

## 2025-02-24 DIAGNOSIS — R92.30 DENSE BREAST TISSUE: ICD-10-CM

## 2025-05-27 ENCOUNTER — HOSPITAL ENCOUNTER (OUTPATIENT)
Age: 45
Setting detail: SPECIMEN
Discharge: HOME | End: 2025-05-27

## 2025-05-27 ENCOUNTER — TELEPHONE (OUTPATIENT)
Dept: OBGYN CLINIC | Age: 45
End: 2025-05-27

## 2025-05-27 DIAGNOSIS — N94.89 ADNEXAL MASS: ICD-10-CM

## 2025-05-27 DIAGNOSIS — N94.89 ADNEXAL MASS: Primary | ICD-10-CM

## 2025-05-27 LAB
CANCER AG125 SERPL-ACNC: 20 U/ML (ref 0–38)
CEA SERPL-MCNC: 1.6 NG/ML (ref 0–3.8)

## 2025-05-27 NOTE — TELEPHONE ENCOUNTER
Patient went to [facility] ER over the weekend for abdominal pain and received ER notes - although incomplete as case was handed off to another provider and do not have those records.    However, of note - CT scan did show large left adnexal mass (7.5 x 9.3 x 4.5cm) and 2.5cm cystic area on right pelvis     In addition - did have 3.5cm splenic lesion - similar to previous MRI - should see PCP for this.     I would like her to come in ASAP for pelvic USN and follow up with a provider please. Can she please also get  and CEA (I placed orders) to evaluate for possible tumor markers.  Ideally can get labs today or tomorrow - I placed orders. Thanks

## 2025-05-27 NOTE — TELEPHONE ENCOUNTER
Patient informed of results/recommendations. Recommended that she get labs done and she is aware that lab orders have been placed and also recommended she f/u with PCP for splenic lesion.

## 2025-05-29 ENCOUNTER — RESULTS FOLLOW-UP (OUTPATIENT)
Dept: OBGYN | Age: 45
End: 2025-05-29

## 2025-06-08 NOTE — PROGRESS NOTES
ovary measures 2.1 x 1.8 x 1.8cm  Cystic area seen adjacent to the right ovary, probable right paraovarian cyst measures 1.9 x 1.5 x 2.1cm  Left ovary normal                          Assessment and Plan         Assessment & Plan  Adnexal mass   Resolved from CT scan - no abnormals seen on USN today  Reassurance given       Cyst of right ovary   Noted small paraovarian cyst   Discussed with patient                 I am having Ashlyn Guadalupe maintain her ALPRAZolam (XANAX PO), busPIRone, cephALEXin, PARoxetine, meloxicam, drospirenone-ethinyl estradiol, cyanocobalamin, Omega-3, Multiple Vitamin (MULTIVITAMIN ADULT PO), and norethindrone.    No follow-ups on file.    She was also counseled on her preventative health maintenance recommendations and follow-up.     There are no Patient Instructions on file for this visit.    JAMEEL JORDAN CNM,6/7/2025 9:15 PM                   Electronically signed by JAMEEL JORDAN CNM                
None

## 2025-06-09 ENCOUNTER — OFFICE VISIT (OUTPATIENT)
Dept: OBGYN CLINIC | Age: 45
End: 2025-06-09
Payer: COMMERCIAL

## 2025-06-09 VITALS
SYSTOLIC BLOOD PRESSURE: 126 MMHG | HEIGHT: 70 IN | WEIGHT: 205 LBS | BODY MASS INDEX: 29.35 KG/M2 | DIASTOLIC BLOOD PRESSURE: 82 MMHG

## 2025-06-09 DIAGNOSIS — N83.201 CYST OF RIGHT OVARY: ICD-10-CM

## 2025-06-09 DIAGNOSIS — N94.89 ADNEXAL MASS: Primary | ICD-10-CM

## 2025-06-09 PROCEDURE — 99213 OFFICE O/P EST LOW 20 MIN: CPT | Performed by: ADVANCED PRACTICE MIDWIFE

## 2025-06-09 ASSESSMENT — ENCOUNTER SYMPTOMS
ABDOMINAL PAIN: 1
DIARRHEA: 0
NAUSEA: 0
VOMITING: 0
RESPIRATORY NEGATIVE: 1